# Patient Record
Sex: MALE | Race: BLACK OR AFRICAN AMERICAN | NOT HISPANIC OR LATINO | Employment: OTHER | URBAN - METROPOLITAN AREA
[De-identification: names, ages, dates, MRNs, and addresses within clinical notes are randomized per-mention and may not be internally consistent; named-entity substitution may affect disease eponyms.]

---

## 2021-06-16 PROBLEM — I47.20 VENTRICULAR TACHYCARDIA (H): Status: ACTIVE | Noted: 2018-10-18

## 2022-04-21 ENCOUNTER — HOSPITAL ENCOUNTER (EMERGENCY)
Facility: CLINIC | Age: 42
Discharge: HOME | End: 2022-04-21
Attending: EMERGENCY MEDICINE | Admitting: EMERGENCY MEDICINE
Payer: COMMERCIAL

## 2022-04-21 ENCOUNTER — APPOINTMENT (OUTPATIENT)
Dept: RADIOLOGY | Facility: CLINIC | Age: 42
End: 2022-04-21
Attending: EMERGENCY MEDICINE
Payer: COMMERCIAL

## 2022-04-21 VITALS
HEART RATE: 74 BPM | SYSTOLIC BLOOD PRESSURE: 132 MMHG | OXYGEN SATURATION: 96 % | RESPIRATION RATE: 20 BRPM | WEIGHT: 285 LBS | TEMPERATURE: 98.8 F | DIASTOLIC BLOOD PRESSURE: 83 MMHG | BODY MASS INDEX: 32.11 KG/M2

## 2022-04-21 DIAGNOSIS — M21.612 BUNION, LEFT: ICD-10-CM

## 2022-04-21 DIAGNOSIS — M79.672 PAIN OF LEFT HEEL: ICD-10-CM

## 2022-04-21 DIAGNOSIS — M77.32 CALCANEAL SPUR OF LEFT FOOT: ICD-10-CM

## 2022-04-21 PROCEDURE — 99284 EMERGENCY DEPT VISIT MOD MDM: CPT

## 2022-04-21 PROCEDURE — 73650 X-RAY EXAM OF HEEL: CPT | Mod: LT

## 2022-04-21 PROCEDURE — 73630 X-RAY EXAM OF FOOT: CPT | Mod: LT

## 2022-04-21 ASSESSMENT — ENCOUNTER SYMPTOMS
NUMBNESS: 0
FEVER: 0
MYALGIAS: 1
WOUND: 0
CONFUSION: 0

## 2022-04-21 NOTE — ED TRIAGE NOTES
Pt here with left heel pain. 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 Did a shift or pivot on his heel and at first it was numb for about 30 minutes. Pt injured his achilles 14 years ago and at first he thought that was it but he can flex at the ankle. CMS intact, very painful to bear any weight on it.

## 2022-04-21 NOTE — ED PROVIDER NOTES
EMERGENCY DEPARTMENT ENCOUNTER      NAME: Hawk Santiago  AGE: 42 year old male  YOB: 1980  MRN: [id number]  EVALUATION DATE & TIME: 4/21/2022 10:48 AM    PCP: No primary care provider on file.    ED PROVIDER: Ricardo Max MD        Chief Complaint   Patient presents with     Foot Pain         FINAL IMPRESSION:  1. Pain of left heel    2. Calcaneal spur of left foot    3. Bunion, left          ED COURSE & MEDICAL DECISION MAKING:    Pertinent Labs & Imaging studies reviewed. (See chart for details)  42 year old male presents to the Emergency Department for evaluation of foot pain    11:17 AM I met the patient and performed my initial interview and exam.  12:42 PM I updated patient on imaging results. We discussed the plan for discharge and the patient is agreeable. Reviewed supportive cares, symptomatic treatment, outpatient follow up, and reasons to return to the Emergency Department. All questions and concerns were addressed. Patient to be discharged by ED RN.     At the conclusion of the encounter I discussed the results of all of the tests and the disposition. The questions were answered. The patient or family acknowledged understanding and was agreeable with the care plan.     ED Course as of 04/21/22 1246   Thu Apr 21, 2022   1130 Former  who had an Achilles reconstruction 14 years ago presents with left sole pain near his heel that developed yesterday when stepping off awkwardly   1131 Full range of motion of his ankle and Achilles without any tenderness.  He only has pain when he is weightbearing and the pain is located next to his heel   1131 Suspect heel contusion, plantar fascial strain, possibly calcaneal fracture but less likely   1131 Achilles rupture, septic joints, dislocation unlikely       X-ray shows heels for.  Based on history likely heel contusion versus plantar fasciitis.  Plan for crutches, conservative therapy, follow-up with [place]  "orthopedics        MEDICATIONS GIVEN IN THE EMERGENCY:  Medications - No data to display    NEW PRESCRIPTIONS STARTED AT TODAY'S ER VISIT  New Prescriptions    No medications on file          =================================================================    HPI    Triage note  \"Pt here with left heel pain. 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 Did a shift or pivot on his heel and at first it was numb for about 30 minutes. Pt injured his achilles 14 years ago and at first he thought that was it but he can flex at the ankle. CMS intact, very painful to bear any weight on it.\"      Patient information was obtained from: patient     Use of : N/A         Hawk Santiago is a 42 year old male with a pertinent history of ventricular tachycardia and s/p achilles repair left who presents to this ED by walk in for evaluation of left foot pain.     Patient reports that he is a . Yesterday, the patient was demonstrating a basketball move and tried shifting on his left foot and had onset of pain in his left heel. He did not feel a pop, but his foot felt different. He tore his achilles on that leg about 14 years ago, but does not think he re-injured that. He was not going at full speed when the injury happened. Patient was able to walk around normally at that time. Over the course of the day, his pain worsened. When he woke up in the night to use the bathroom last night, he was unable to put any pressure on his foot secondary to localized pain in his heel. He describes the pain as his foot feeling like it is on fire. Patient has no history of heel pain. He is concerned that he may have torn a tendon in his foot. Denies swelling or any other complaints at this time.     REVIEW OF SYSTEMS   Review of Systems   Constitutional: Negative for fever.   Cardiovascular: Negative for leg swelling.   Musculoskeletal: Positive for myalgias.        Positive for left heel pain   Skin: Negative " for wound.   Neurological: Negative for numbness.   Psychiatric/Behavioral: Negative for confusion.     PAST MEDICAL HISTORY:  History reviewed. No pertinent past medical history.    PAST SURGICAL HISTORY:  Past Surgical History:   Procedure Laterality Date     REPAIR TENDON ACHILLES             CURRENT MEDICATIONS:    ondansetron (ZOFRAN) 4 MG tablet        ALLERGIES:  No Known Allergies    FAMILY HISTORY:  History reviewed. No pertinent family history.    SOCIAL HISTORY:   Social History     Socioeconomic History     Marital status: Single       VITALS:  /83   Pulse 74   Temp 98.8  F (37.1  C) (Oral)   Resp 20   Wt 129.3 kg (285 lb)   SpO2 96%   BMI 32.11 kg/m      PHYSICAL EXAM      Vitals: /83   Pulse 74   Temp 98.8  F (37.1  C) (Oral)   Resp 20   Wt 129.3 kg (285 lb)   SpO2 96%   BMI 32.11 kg/m    General: Appears in no acute distress, awake, alert, interactive.  Eyes: Conjunctivae non-injected. Sclera anicteric.  HENT: Atraumatic.  Neck: Supple.  Respiratory/Chest: Respiration unlabored.  Abdomen: non distended  Musculoskeletal: Full ROM of left ankle. No metatarsal tenderness. Achilles intact and non tender. Tenderness to left heel at plantar aspect near plantar fascial insertion on calcaneus.  Bunion  Skin: Normal color. No rash or diaphoresis.  Neurologic: Face symmetric, moves all extremities spontaneously. Speech clear.  Psychiatric: Oriented to person, place, and time. Affect appropriate.     LAB:  All pertinent labs reviewed and interpreted.  Results for orders placed or performed during the hospital encounter of 04/21/22   XR Calcaneus Left G/E 2 Views    Impression    IMPRESSION: Normal alignment without a fracture. No effusion.    There is a 6 mm plantar calcaneal spur with a punctate ossification superficial to this. In addition coarse 12 x 7 mm heterotopic ossification noted about 1 cm proximal to the insertion of the Achilles. Extensive arterial calcifications.   Foot XR,  G/E 3 views, left    Impression    IMPRESSION: No fracture. Mild hallux valgus. Small plantar calcaneal spur. Small focus of heterotopic ossification in the mid to distal portion of the Achilles tendon.       RADIOLOGY:  Reviewed all pertinent imaging. Please see official radiology report.  Foot XR, G/E 3 views, left   Final Result   IMPRESSION: No fracture. Mild hallux valgus. Small plantar calcaneal spur. Small focus of heterotopic ossification in the mid to distal portion of the Achilles tendon.      XR Calcaneus Left G/E 2 Views   Final Result   IMPRESSION: Normal alignment without a fracture. No effusion.      There is a 6 mm plantar calcaneal spur with a punctate ossification superficial to this. In addition coarse 12 x 7 mm heterotopic ossification noted about 1 cm proximal to the insertion of the Achilles. Extensive arterial calcifications.          PROCEDURES:   None      I, Guillermo Love, am serving as a scribe to document services personally performed by Luis Max MD based on my observation and the provider's statements to me. I, Dr. Luis Max, attest that Guillermo Love is acting in a scribe capacity, has observed my performance of the services and has documented them in accordance with my direction.    Luis Max MD  Emergency Medicine  [facility] EMERGENCY ROOM  [address] [facility] [facility] [address] [id number] [phone]     Luis Max MD  04/21/22 0277